# Patient Record
(demographics unavailable — no encounter records)

---

## 2017-06-11 NOTE — EDM.PDOC
ED HPI GENERAL MEDICAL PROBLEM





- General


Chief Complaint: General


Stated Complaint: SEVERE ABDOMINAL PAIN


Time Seen by Provider: 06/11/17 17:58


Source of Information: Reports: Patient


History Limitations: Reports: No Limitations





- History of Present Illness


INITIAL COMMENTS - FREE TEXT/NARRATIVE: 


History of present illness:


[53-year-old female coming in complaining of right-sided rib pain. Patient 

indicates she was sliding down the slide with her granddaughter when she 

developed abrupt halt impacting her right side and now has subsequent 

significant amounts of pain that prevented her from sitting back and/or sitting 

still. Patient indicates that the pain comes and stabbing waves.]





Review of systems: 


As per history of present illness and below otherwise all systems reviewed and 

negative.





Past medical history: 


As per history of present illness and as reviewed below otherwise 

noncontributory.





Surgical history: 


As per history of present illness and as reviewed below otherwise 

noncontributory.





Social history: 


No reported history of drug or alcohol abuse.





Family history: 


As per history of present illness and as reviewed below otherwise 

noncontributory.





Physical exam:


HEENT: Atraumatic, normocephalic, pupils reactive, negative for conjunctival 

pallor or scleral icterus, mucous membranes moist, throat clear, neck supple, 

nontender, trachea midline.


Lungs: Clear to auscultation, breath sounds equal bilaterally, chest tender on 

right side specific to rib area right at bra line.


Heart: S1S2, regular, negative for clicks, rubs, or JVD.


Abdomen: Soft, nondistended, nontender. Negative for masses or 

hepatosplenomegaly. Negative for costovertebral tenderness.


Pelvis: Stable nontender.


Genitourinary: Deferred.


Rectal: Deferred.


Extremities: Atraumatic, negative for cords or calf pain. Neurovascular 

unremarkable.


Neuro: Awake, alert, oriented. Cranial nerves II through XII unremarkable. 

Cerebellum unremarkable. Motor and sensory unremarkable throughout. Exam 

nonfocal.








Global assessment is benign save subjective complaint of extreme rib pain 

patient has no bruising or abrasions to that side of her trunk.








Diagnostics:


[Chest x-ray]





Therapeutics:


[Norflex IM, Toradol IM]





Impression: 


[Rib pain]





Plan:


[Flexeril, Vicodin]





Definitive disposition and diagnosis as appropriate pending reevaluation and 

review of above.








  ** Right Thoracic


Pain Score (Numeric/FACES): 7





- Related Data


 Allergies











Allergy/AdvReac Type Severity Reaction Status Date / Time


 


morphine Allergy  Hives Verified 06/11/17 17:50











Home Meds: 


 Home Meds





Furosemide [Lasix] 20 mg PO DAILY 05/10/14 [History]


Levothyroxine 75 mcg PO ASDIRECTED 05/10/14 [History]











Past Medical History





- Past Health History


Medical/Surgical History: Denies Medical/Surgical History


HEENT History: Reports: Sinusitis


Other Cardiovascular History: retains water and heart valvue problems





Social & Family History





- Family History


Family Medical History: Noncontributory





- Tobacco Use


Smoking Status *Q: Never Smoker


Second Hand Smoke Exposure: No





- Alcohol Use


Days Per Week of Alcohol Use: 0





- Recreational Drug Use


Recreational Drug Use: No





ED ROS GENERAL





- Review of Systems


Review Of Systems: See Below (See history of present illness)





ED EXAM, GENERAL





- Physical Exam


Exam: See Below (See history of present illness)





Course





- Vital Signs


Last Recorded V/S: 


 Last Vital Signs











Temp  36.5 C   06/11/17 17:51


 


Pulse  65   06/11/17 17:51


 


Resp  22 H  06/11/17 17:51


 


BP  106/64   06/11/17 17:51


 


Pulse Ox  100   06/11/17 17:51














- Orders/Labs/Meds


Orders: 


 Active Orders 24 hr











 Category Date Time Status


 


 Chest 2V [CR] Stat Exams  06/11/17 17:59 Taken











Meds: 


Medications














Discontinued Medications














Generic Name Dose Route Start Last Admin





  Trade Name Freq  PRN Reason Stop Dose Admin


 


Ketorolac Tromethamine  60 mg  06/11/17 17:58  06/11/17 18:21





  Toradol  IM  06/11/17 17:59  60 mg





  ONETIME ONE   Administration


 


Orphenadrine Citrate  60 mg  06/11/17 17:58  06/11/17 18:22





  Norflex  IM  06/11/17 17:59  60 mg





  ONETIME STA   Administration














Departure





- Departure


Time of Disposition: 19:30


Disposition: Home, Self-Care 01


Condition: good


Clinical Impression: 


 Rib pain on right side








- Discharge Information


Forms:  ED Department Discharge


Additional Instructions: 


The following information is given to patients seen in the emergency department 

who are being discharged to home. This information is to outline your options 

for follow-up care. We provide all patients seen in our emergency department 

with a follow-up referral.





The need for follow-up, as well as the timing and circumstances, are variable 

depending upon the specifics of your emergency department visit.





If you don't have a primary care physician on staff, we will provide you with a 

referral. We always advise you to contact your personal physician following an 

emergency department visit to inform them of the circumstance of the visit and 

for follow-up with them and/or the need for any referrals to a consulting 

specialist.





The emergency department will also refer you to a specialist when appropriate. 

This referral assures that you have the opportunity for follow-up care with a 

specialist. All of these measure are taken in an effort to provide you with 

optimal care, which includes your follow-up.





Under all circumstances we always encourage you to contact your private 

physician who remains a resource for coordinating your care. When calling for 

follow-up care, please make the office aware that this follow-up is from your 

recent emergency room visit. If for any reason you are refused follow-up, 

please contact the [facility] Emergency 

Department at [phone] and asked to speak to the emergency department 

charge nurse.





Take medication as directed








May alternate ice and heat to ribs as needed no longer than 20 minutes at at 

time





Follow-up with PCP in 1-2 days








Return to ED as needed as discussed











- My Orders


Last 24 Hours: 


My Active Orders





06/11/17 17:59


Chest 2V [CR] Stat 














- Assessment/Plan


Last 24 Hours: 


My Active Orders





06/11/17 17:59


Chest 2V [CR] Stat

## 2017-06-12 NOTE — CR
EXAM DATE: 17



PATIENT'S AGE: 53





Patient: MARGE PRITCHETT



Facility: [place], [place]

Patient ID: [id number]

Site Patient ID: [id number].

Site Accession #: [id number].

: 1963

Study: XRay Chest [id number]/11/2017 6:32:59 PM

Ordering Physician: Doctor Temp



Final Report: 

CHEST 2 VIEWS



INDICATION:

Trauma with right-sided rib pain.



IMPRESSION:

Normal heart size and vascular pattern.

Lungs are clear.

No pneumothorax or pleural abnormality.

No obvious displacement of the ribs is noted.





Dictated by Anjel Guo MD @ 2017 6:51PM

(Electronic Signature)



Report Signed by Proxy.
MARTIN